# Patient Record
Sex: MALE | Race: WHITE | NOT HISPANIC OR LATINO | Employment: FULL TIME | URBAN - METROPOLITAN AREA
[De-identification: names, ages, dates, MRNs, and addresses within clinical notes are randomized per-mention and may not be internally consistent; named-entity substitution may affect disease eponyms.]

---

## 2019-12-03 ENCOUNTER — OFFICE VISIT (OUTPATIENT)
Dept: FAMILY MEDICINE | Facility: CLINIC | Age: 39
End: 2019-12-03
Payer: COMMERCIAL

## 2019-12-03 VITALS
DIASTOLIC BLOOD PRESSURE: 73 MMHG | OXYGEN SATURATION: 98 % | WEIGHT: 215.7 LBS | BODY MASS INDEX: 28.59 KG/M2 | HEIGHT: 73 IN | SYSTOLIC BLOOD PRESSURE: 118 MMHG | HEART RATE: 73 BPM | RESPIRATION RATE: 16 BRPM | TEMPERATURE: 98.4 F

## 2019-12-03 DIAGNOSIS — H90.3 SENSORINEURAL HEARING LOSS, BILATERAL: ICD-10-CM

## 2019-12-03 DIAGNOSIS — Z13.6 CARDIOVASCULAR SCREENING; LDL GOAL LESS THAN 160: ICD-10-CM

## 2019-12-03 DIAGNOSIS — Z00.00 ROUTINE GENERAL MEDICAL EXAMINATION AT A HEALTH CARE FACILITY: Primary | ICD-10-CM

## 2019-12-03 LAB
CHOLEST SERPL-MCNC: 191 MG/DL
GLUCOSE SERPL-MCNC: 141 MG/DL (ref 70–99)
HDLC SERPL-MCNC: 44 MG/DL
LDLC SERPL CALC-MCNC: 99 MG/DL
NONHDLC SERPL-MCNC: 147 MG/DL
TRIGL SERPL-MCNC: 240 MG/DL

## 2019-12-03 PROCEDURE — 36415 COLL VENOUS BLD VENIPUNCTURE: CPT | Performed by: FAMILY MEDICINE

## 2019-12-03 PROCEDURE — 80061 LIPID PANEL: CPT | Performed by: FAMILY MEDICINE

## 2019-12-03 PROCEDURE — 99385 PREV VISIT NEW AGE 18-39: CPT | Performed by: FAMILY MEDICINE

## 2019-12-03 PROCEDURE — 82947 ASSAY GLUCOSE BLOOD QUANT: CPT | Performed by: FAMILY MEDICINE

## 2019-12-03 ASSESSMENT — MIFFLIN-ST. JEOR: SCORE: 1952.29

## 2019-12-03 NOTE — LETTER
Toño Westfall  [address]  [facility] [address]      Dear Toño    It was nice to see you at your recent visit.  Enclosed are the lab results taken at that time.    Resulted Orders   Lipid panel reflex to direct LDL Non-fasting   Result Value Ref Range    Cholesterol 191 <200 mg/dL    Triglycerides 240 (H) <150 mg/dL      Comment:      Borderline high:  150-199 mg/dl  High:             200-499 mg/dl  Very high:       >499 mg/dl  Non Fasting      HDL Cholesterol 44 >39 mg/dL    LDL Cholesterol Calculated 99 <100 mg/dL      Comment:      Desirable:       <100 mg/dl    Non HDL Cholesterol 147 (H) <130 mg/dL      Comment:      Above Desirable:  130-159 mg/dl  Borderline high:  160-189 mg/dl  High:             190-219 mg/dl  Very high:       >219 mg/dl     Glucose   Result Value Ref Range    Glucose 141 (H) 70 - 99 mg/dL      Comment:      Non Fasting     You have elevated results and should come in for your next visit fasting so we can get a more accurate picture of your risk for heart disease and diabetes    Sincerely,    Joseph Carlos Christensen MD MPH

## 2019-12-03 NOTE — PROGRESS NOTES
SUBJECTIVE:   CC: Toño Westfall is an 39 year old male who presents for preventative health visit.     Healthy Habits:     Getting at least 3 servings of Calcium per day:  NO    Bi-annual eye exam:  NO    Dental care twice a year:  NO    Sleep apnea or symptoms of sleep apnea:  None    Diet:  Regular (no restrictions)    Frequency of exercise:  None    Taking medications regularly:  Not Applicable    Medication side effects:  Not applicable    PHQ-2 Total Score: 0    Additional concerns today:  Yes    PATIENT WOULD ALSO LIKE TO DISCUSS SOME HEARING CONCERNS THAT HE'S BEEN NOTICING-    Normal TMs  Referred to audiology    Today's PHQ-2 Score:   PHQ-2 ( 1999 [employer]) 12/3/2019   Q1: Little interest or pleasure in doing things 0   Q2: Feeling down, depressed or hopeless 0   PHQ-2 Score 0   Q1: Little interest or pleasure in doing things Not at all   Q2: Feeling down, depressed or hopeless Not at all   PHQ-2 Score 0       Abuse: Current or Past(Physical, Sexual or Emotional)- No  Do you feel safe in your environment? Yes      Social History     Tobacco Use     Smoking status: Never Smoker     Smokeless tobacco: Never Used   Substance Use Topics     Alcohol use: Yes     Comment: occasion only socially     If you drink alcohol do you typically have >3 drinks per day or >7 drinks per week? No    Alcohol Use 12/3/2019   Prescreen: >3 drinks/day or >7 drinks/week? No   Prescreen: >3 drinks/day or >7 drinks/week? -   No flowsheet data found.    Last PSA: No results found for: PSA    Reviewed orders with patient. Reviewed health maintenance and updated orders accordingly - Yes  Lab work is in process  Labs reviewed in EPIC  BP Readings from Last 3 Encounters:   12/03/19 118/73   06/18/15 120/73   02/05/13 128/67    Wt Readings from Last 3 Encounters:   12/03/19 97.8 kg (215 lb 11.2 oz)   06/18/15 92.2 kg (203 lb 4.8 oz)   02/05/13 86.6 kg (191 lb)                  Patient Active Problem List   Diagnosis     CARDIOVASCULAR  "SCREENING; LDL GOAL LESS THAN 160     History reviewed. No pertinent surgical history.    Social History     Tobacco Use     Smoking status: Never Smoker     Smokeless tobacco: Never Used   Substance Use Topics     Alcohol use: Yes     Comment: occasion only socially     Family History   Problem Relation Age of Onset     Asthma Father      Asthma Brother      C.A.D. No family hx of      Diabetes No family hx of      Hypertension No family hx of      Cerebrovascular Disease No family hx of      Cancer No family hx of      Heart Disease No family hx of      Blood Disease No family hx of      Alzheimer Disease No family hx of          No current outpatient medications on file.     Allergies   Allergen Reactions     Nkda [No Known Drug Allergies]        Reviewed and updated as needed this visit by clinical staff  Tobacco  Allergies  Meds  Problems  Med Hx  Surg Hx  Fam Hx  Soc Hx          Reviewed and updated as needed this visit by Provider            Review of Systems  CONSTITUTIONAL: NEGATIVE for fever, chills, change in weight  INTEGUMENTARY/SKIN: NEGATIVE for worrisome rashes, moles or lesions  EYES: NEGATIVE for vision changes or irritation  ENT: NEGATIVE for ear, mouth and throat problems  RESP: NEGATIVE for significant cough or SOB  CV: NEGATIVE for chest pain, palpitations or peripheral edema  GI: NEGATIVE for nausea, abdominal pain, heartburn, or change in bowel habits   male: negative for dysuria, hematuria, decreased urinary stream, erectile dysfunction, urethral discharge  MUSCULOSKELETAL: NEGATIVE for significant arthralgias or myalgia  NEURO: NEGATIVE for weakness, dizziness or paresthesias  PSYCHIATRIC: NEGATIVE for changes in mood or affect    OBJECTIVE:   /73   Pulse 73   Temp 98.4  F (36.9  C) (Oral)   Resp 16   Ht 1.854 m (6' 1\")   Wt 97.8 kg (215 lb 11.2 oz)   SpO2 98%   BMI 28.46 kg/m      Physical Exam    General Appearance: Pleasant, alert, in no acute respiratory " "distress.  Head Exam: Normal. Normocephalic, atraumatic. No sinus tenderness.  Eye Exam: Normal external eye, conjunctiva, lids. JESUS MANUEL.  Ear Exam: Normal auditory canals and external ears. Non-tender.  Left TM-normal. Right TM-normal.  OroPharynx Exam: Dental hygiene adequate. Normal buccal mucosa. Normal pharynx.  Neck Exam: Supple, no masses or enlarged, tender nodes.  Thyroid Exam: No nodules or enlargement or tenderness.  Chest/Respiratory Exam: Normal, comfortable, easy respirations. Chest wall normal. Lungs are clear to auscultation. No wheezing, crackles, or rhonchi.  Cardiovascular Exam: Regular rate and rhythm. No murmur, gallop, or rubs. No pedal edema.  Gastrointestinal Exam: Soft, non-tender, no masses or organomegaly.  Musculoskeletal Exam: Back is non-tender, full ROM of upper and lower extremities.  Skin: no rash, warm and dry.    Neurologic Exam: Nonfocal, no tremor. Normal gait.  Psychiatric Exam: Alert - appropriate, normal affect      ASSESSMENT/PLAN:       ICD-10-CM    1. Routine general medical examination at a health care facility Z00.00        COUNSELING:   Reviewed preventive health counseling, as reflected in patient instructions       Regular exercise       Healthy diet/nutrition    Declined flu and TDAP    Estimated body mass index is 28.46 kg/m  as calculated from the following:    Height as of this encounter: 1.854 m (6' 1\").    Weight as of this encounter: 97.8 kg (215 lb 11.2 oz).        reports that he has never smoked. He has never used smokeless tobacco.      Counseling Resources:  ATP IV Guidelines  Pooled Cohorts Equation Calculator  FRAX Risk Assessment  ICSI Preventive Guidelines  Dietary Guidelines for Americans, 2010  USDA's MyPlate  ASA Prophylaxis  Lung CA Screening    Joseph Carlos Christensen MD  [facility]  "

## 2023-10-01 ENCOUNTER — HOSPITAL ENCOUNTER (EMERGENCY)
Facility: CLINIC | Age: 43
Discharge: HOME | End: 2023-10-01
Attending: EMERGENCY MEDICINE | Admitting: EMERGENCY MEDICINE
Payer: COMMERCIAL

## 2023-10-01 VITALS
RESPIRATION RATE: 18 BRPM | WEIGHT: 200 LBS | OXYGEN SATURATION: 98 % | DIASTOLIC BLOOD PRESSURE: 84 MMHG | HEART RATE: 85 BPM | BODY MASS INDEX: 26.51 KG/M2 | HEIGHT: 73 IN | TEMPERATURE: 98.7 F | SYSTOLIC BLOOD PRESSURE: 134 MMHG

## 2023-10-01 DIAGNOSIS — S61.012A THUMB LACERATION, LEFT, INITIAL ENCOUNTER: Primary | ICD-10-CM

## 2023-10-01 PROCEDURE — 250N000011 HC RX IP 250 OP 636: Performed by: EMERGENCY MEDICINE

## 2023-10-01 PROCEDURE — 90471 IMMUNIZATION ADMIN: CPT | Performed by: EMERGENCY MEDICINE

## 2023-10-01 PROCEDURE — 99283 EMERGENCY DEPT VISIT LOW MDM: CPT | Mod: 25

## 2023-10-01 PROCEDURE — 12001 RPR S/N/AX/GEN/TRNK 2.5CM/<: CPT

## 2023-10-01 PROCEDURE — 90715 TDAP VACCINE 7 YRS/> IM: CPT | Performed by: EMERGENCY MEDICINE

## 2023-10-01 RX ORDER — LIDOCAINE HYDROCHLORIDE 10 MG/ML
INJECTION, SOLUTION EPIDURAL; INFILTRATION; INTRACAUDAL; PERINEURAL
Status: DISCONTINUED
Start: 2023-10-01 | End: 2023-10-02 | Stop reason: HOSPADM

## 2023-10-01 RX ORDER — LIDOCAINE HYDROCHLORIDE AND EPINEPHRINE 10; 10 MG/ML; UG/ML
INJECTION, SOLUTION INFILTRATION; PERINEURAL
Status: DISCONTINUED
Start: 2023-10-01 | End: 2023-10-02 | Stop reason: HOSPADM

## 2023-10-01 RX ADMIN — CLOSTRIDIUM TETANI TOXOID ANTIGEN (FORMALDEHYDE INACTIVATED), CORYNEBACTERIUM DIPHTHERIAE TOXOID ANTIGEN (FORMALDEHYDE INACTIVATED), BORDETELLA PERTUSSIS TOXOID ANTIGEN (GLUTARALDEHYDE INACTIVATED), BORDETELLA PERTUSSIS FILAMENTOUS HEMAGGLUTININ ANTIGEN (FORMALDEHYDE INACTIVATED), BORDETELLA PERTUSSIS PERTACTIN ANTIGEN, AND BORDETELLA PERTUSSIS FIMBRIAE 2/3 ANTIGEN 0.5 ML: 5; 2; 2.5; 5; 3; 5 INJECTION, SUSPENSION INTRAMUSCULAR at 22:41

## 2023-10-01 ASSESSMENT — ACTIVITIES OF DAILY LIVING (ADL): ADLS_ACUITY_SCORE: 33

## 2023-10-02 NOTE — ED PROVIDER NOTES
"  History     Chief Complaint:  Laceration     HPI   Toño Westfall is a 43 year old male who presents for evaluation of a laceration. The patient reports that around 1700 this evening, he was using a brand new knife to cut packaging on a bike when he slipped and accidentally cut the pad of his left thumb. Notes he still has full range of motion of his finger. Denies additional injury. Unclear of his tetanus vaccination status.      Independent Historian:   None - Patient Only    Review of External Notes:   None    Medications:    The patient is currently on no regular medications.    Past Medical History:    There is no pertinent past medical history     Physical Exam   Patient Vitals for the past 24 hrs:   BP Temp Temp src Pulse Resp SpO2 Height Weight   10/01/23 2002 134/84 98.7  F (37.1  C) Oral 85 18 98 % 1.854 m (6' 1\") 90.7 kg (200 lb)      Constitutional:       Appearance: Normal appearance.      General: Not in acute distress.  HENT:      Head: Normocephalic and atraumatic.   Eyes:      Extraocular Movements: Extraocular movements intact.      Conjunctiva/sclera: Conjunctivae normal.   Cardiovascular:      Rate and Rhythm: Normal rate and regular rhythm.   Pulmonary:      Effort: Pulmonary effort is normal. No respiratory distress.   Abdominal:      General: Abdomen is flat. There is no distension.   Musculoskeletal:         General: No swelling or deformity.      Cervical back: Normal range of motion. No rigidity.      Left thumb: Intact flexor and extensor mechanisms.  No nailbed injury.  2.3 vertical linear laceration to the thumb pad.  No exposure or damage of underlying tendon or bone.  Wound fully explored with no evidence of foreign body.  Cap refill less than 2 seconds.  Skin:     Coloration: Skin is not jaundiced or pale.   Neurological:      General: No focal deficit present.      Mental Status: Alert and oriented to person, place, and time.   Psychiatric:         Mood and Affect: Mood normal.   "       Behavior: Behavior normal.        Emergency Department Course   Procedures    Digital Block       Procedure: Digital Block    Indication: Wound care    Consent: Verbal    Preparation: Povidone-iodine    Anesthesia: A digital block was performed with Lidocaine - 1% on the affected digit.     Location: L first (thumb) finger    Procedure Detail: A digital block was performed on the indicated digit with the above anesthetic.     Patient status: The patient tolerated the procedure well: Yes. There were no complications.      Laceration Repair      Procedure: Laceration Repair    Indication: Laceration    Consent: Verbal    Location: Left L first (thumb) finger    Length: 2.3 cm    Preparation: Irrigation with Sterile Saline and Pressure device utilized of 1L    Anesthesia/Sedation: Lidocaine - 1%      Treatment/Exploration: Wound explored, no foreign bodies found     Closure: The wound was closed with one layer. Skin/superficial layer was closed with 5 x 4-0 Nylon using Interrupted sutures.     Patient Status: The patient tolerated the procedure well: Yes. There were no complications.    Emergency Department Course & Assessments:     Interventions:  Medications   lidocaine (PF) (XYLOCAINE) 1 % injection (has no administration in time range)   Tdap (tetanus-diphtheria-acell pertussis) (ADACEL) injection 0.5 mL (0.5 mLs Intramuscular $Given 10/1/23 2241)      Independent Interpretation (X-rays, CTs, rhythm strip):  None    Assessments/Consultations/Discussion of Management or Tests:  ED Course as of 10/02/23 0050   Sun Oct 01, 2023   2218 I obtained history and examined the patient as noted above.     I rechecked and updated the patient. I performed digital block as noted above.     Social Determinants of Health affecting care:   None    Disposition:  The patient was discharged to home.     Impression & Plan    Medical Decision Makin-year-old male as described above presents to the emergency department  with laceration to the distal left thumb finger pad.  Patient hemodynamically stable at time evaluation.  Afebrile.  Normal range of motion of the left thumb without evidence of tendon or bone exposure on wound exploration.  Given this is knife injury and not a crush type injury or high risk injury for foreign body exposure, x-ray not indicated at this time.  Low suspicion for fracture.  Wound was copiously washed out and explored with no evidence of retained foreign body.  Will perform suture laceration repair.  Sutures are to be removed in 7 to 10 days.  No indication for antibiotic prophylaxis at this time.  Patient out of date on tetanus vaccination on chart review.  Will update accordingly.  Discussed care plan with patient who voiced understanding and agreement with plan.  Answered all questions.  Additional work-up and orders as listed in chart.    Please refer to ED course above for details on the patient's treatment course and any changes or updates in care plan beyond my initial evaluation and MDM.      Diagnosis:    ICD-10-CM    1. Thumb laceration, left, initial encounter  S61.012A            Discharge Medications:  There are no discharge medications for this patient.    Scribe Disclosure:  I, Neetu Aguayo, am serving as a scribe at 10:03 PM on 10/1/2023 to document services personally performed by Jasbir French DO based on my observations and the provider's statements to me.    10/1/2023   Jasbir French DO Yeh, Ferris, DO  10/02/23 0050

## 2023-10-02 NOTE — ED TRIAGE NOTES
Pt arrives to the ED due to laceration to left thumb. Pt states she was opening up packaging when the knife got away from him. Denies blood thinners. Unsure of last tetanus.